# Patient Record
Sex: MALE | Race: WHITE | URBAN - METROPOLITAN AREA
[De-identification: names, ages, dates, MRNs, and addresses within clinical notes are randomized per-mention and may not be internally consistent; named-entity substitution may affect disease eponyms.]

---

## 2019-01-29 ENCOUNTER — OFFICE VISIT (OUTPATIENT)
Dept: URBAN - METROPOLITAN AREA CLINIC 45 | Facility: CLINIC | Age: 69
End: 2019-01-29
Payer: MEDICARE

## 2019-01-29 DIAGNOSIS — E11.9 TYPE 2 DIABETES MELLITUS WITHOUT COMPLICATIONS: ICD-10-CM

## 2019-01-29 DIAGNOSIS — H40.023 OPEN ANGLE WITH BORDERLINE FINDINGS, HIGH RISK, BILATERAL: Primary | ICD-10-CM

## 2019-01-29 DIAGNOSIS — H52.4 PRESBYOPIA: ICD-10-CM

## 2019-01-29 PROCEDURE — 92004 COMPRE OPH EXAM NEW PT 1/>: CPT | Performed by: OPTOMETRIST

## 2019-01-29 PROCEDURE — 92250 FUNDUS PHOTOGRAPHY W/I&R: CPT | Performed by: OPTOMETRIST

## 2019-01-29 ASSESSMENT — INTRAOCULAR PRESSURE
OS: 17
OD: 19

## 2019-01-29 NOTE — IMPRESSION/PLAN
Impression: Type 2 diabetes mellitus without complications: U86.4. Plan: Diabetes type II: no background retinopathy, no signs of neovascularization noted. Discussed ocular and systemic benefits of blood sugar control.

## 2019-02-04 ENCOUNTER — OFFICE VISIT (OUTPATIENT)
Dept: URBAN - METROPOLITAN AREA CLINIC 45 | Facility: CLINIC | Age: 69
End: 2019-02-04
Payer: MEDICARE

## 2019-02-04 ASSESSMENT — KERATOMETRY
OD: 42.25
OS: 42.50

## 2019-02-04 ASSESSMENT — INTRAOCULAR PRESSURE
OD: 19
OS: 16

## 2019-02-04 ASSESSMENT — VISUAL ACUITY
OD: 20/20
OS: 20/20

## 2022-01-03 ENCOUNTER — OFFICE VISIT (OUTPATIENT)
Dept: URBAN - METROPOLITAN AREA CLINIC 44 | Facility: CLINIC | Age: 72
End: 2022-01-03
Payer: MEDICARE

## 2022-01-03 DIAGNOSIS — H25.13 AGE-RELATED NUCLEAR CATARACT, BILATERAL: ICD-10-CM

## 2022-01-03 DIAGNOSIS — H04.123 TEAR FILM INSUFFICIENCY OF BILATERAL LACRIMAL GLANDS: Primary | ICD-10-CM

## 2022-01-03 DIAGNOSIS — H40.1133 PRIMARY OPEN-ANGLE GLAUCOMA, SEVERE STAGE, BILATERAL: ICD-10-CM

## 2022-01-03 PROCEDURE — 92133 CPTRZD OPH DX IMG PST SGM ON: CPT | Performed by: OPTOMETRIST

## 2022-01-03 PROCEDURE — 99204 OFFICE O/P NEW MOD 45 MIN: CPT | Performed by: OPTOMETRIST

## 2022-01-03 RX ORDER — LATANOPROST 50 UG/ML
0.005 % SOLUTION OPHTHALMIC
Qty: 2.5 | Refills: 3 | Status: INACTIVE
Start: 2022-01-03 | End: 2022-03-03

## 2022-01-03 ASSESSMENT — KERATOMETRY
OD: 42.50
OS: 42.75

## 2022-01-03 ASSESSMENT — INTRAOCULAR PRESSURE
OD: 20
OS: 22

## 2022-01-03 ASSESSMENT — VISUAL ACUITY
OS: 20/20
OD: 20/20

## 2022-01-03 NOTE — IMPRESSION/PLAN
Impression: Primary open-angle glaucoma, severe stage, bilateral: H40.1133. Plan: Advanced cupping OD>OS
      -Drance hemorrhage OS on 01/03/21 IOP: 20/22 OCT RNFL (01/03/22): OD 73 (bdl overall, sup/inf thinning) OS 73 (bdl overall, sup/inf thinning) Start latanoprost QHS OU.   RTC 2 months for IOP + pachymetry + 24-2 HVF

## 2022-03-03 ENCOUNTER — OFFICE VISIT (OUTPATIENT)
Dept: URBAN - METROPOLITAN AREA CLINIC 44 | Facility: CLINIC | Age: 72
End: 2022-03-03
Payer: MEDICARE

## 2022-03-03 PROCEDURE — 76514 ECHO EXAM OF EYE THICKNESS: CPT | Performed by: OPTOMETRIST

## 2022-03-03 PROCEDURE — 92083 EXTENDED VISUAL FIELD XM: CPT | Performed by: OPTOMETRIST

## 2022-03-03 PROCEDURE — 99213 OFFICE O/P EST LOW 20 MIN: CPT | Performed by: OPTOMETRIST

## 2022-03-03 RX ORDER — LATANOPROST 50 UG/ML
0.005 % SOLUTION OPHTHALMIC
Qty: 7.5 | Refills: 3 | Status: ACTIVE
Start: 2022-03-03

## 2022-03-03 ASSESSMENT — INTRAOCULAR PRESSURE
OD: 15
OS: 15
OD: 20
OS: 21

## 2022-03-03 NOTE — IMPRESSION/PLAN
Impression: Primary open-angle glaucoma, severe stage, bilateral: H40.1133. Plan: Advanced cupping OD>OS
      -Drance hemorrhage OS on 01/03/21 PACH: 560/560 IOP: 15/15 Current meds: latanoprost QHS OU (started 01/03/22) OCT RNFL (01/03/22): OD 73 (bdl overall, sup/inf thinning) OS 73 (bdl overall, sup/inf thinning) HVF (03/03/22) OD: nasal step (unreliable) OS: mild scatter (unreliable) IOP improved but may still be borderline. Continue current meds. RTC 3 months for IOP check. May need to add meds if IOP increases.

## 2022-06-08 ENCOUNTER — OFFICE VISIT (OUTPATIENT)
Dept: URBAN - METROPOLITAN AREA CLINIC 44 | Facility: CLINIC | Age: 72
End: 2022-06-08
Payer: MEDICARE

## 2022-06-08 DIAGNOSIS — H40.1133 PRIMARY OPEN-ANGLE GLAUCOMA, SEVERE STAGE, BILATERAL: Primary | ICD-10-CM

## 2022-06-08 PROCEDURE — 99213 OFFICE O/P EST LOW 20 MIN: CPT | Performed by: OPTOMETRIST

## 2022-06-08 RX ORDER — BRIMONIDINE TARTRATE 2 MG/ML
0.2 % SOLUTION/ DROPS OPHTHALMIC
Qty: 10 | Refills: 3 | Status: ACTIVE
Start: 2022-06-08

## 2022-06-08 ASSESSMENT — INTRAOCULAR PRESSURE
OD: 18
OS: 17
OD: 14
OS: 18

## 2022-06-08 NOTE — IMPRESSION/PLAN
Impression: Primary open-angle glaucoma, severe stage, bilateral: H40.1133. Plan: Advanced cupping OD>OS
      -Drance hemorrhage OS on 01/03/21 PACH: 560/560 IOP: 18/18 Current meds: latanoprost QHS OU (started 01/03/22) Hx of Asthma -- avoid beta blockers OCT RNFL (01/03/22): OD 73 (bdl overall, sup/inf thinning) OS 73 (bdl overall, sup/inf thinning) HVF (03/03/22) OD: nasal step (unreliable) OS: mild scatter (unreliable) IOP suboptimal.  Continue latanoprost QHS OU. Start brimonidine BID OU. RTC 2 months for IOP check.

## 2022-08-01 ENCOUNTER — OFFICE VISIT (OUTPATIENT)
Dept: URBAN - METROPOLITAN AREA CLINIC 44 | Facility: CLINIC | Age: 72
End: 2022-08-01
Payer: MEDICARE

## 2022-08-01 DIAGNOSIS — H40.1133 PRIMARY OPEN-ANGLE GLAUCOMA, SEVERE STAGE, BILATERAL: Primary | ICD-10-CM

## 2022-08-01 PROCEDURE — 99213 OFFICE O/P EST LOW 20 MIN: CPT | Performed by: OPTOMETRIST

## 2022-08-01 RX ORDER — BRIMONIDINE TARTRATE 2 MG/ML
0.2 % SOLUTION/ DROPS OPHTHALMIC
Qty: 30 | Refills: 3 | Status: ACTIVE
Start: 2022-08-01

## 2022-08-01 ASSESSMENT — INTRAOCULAR PRESSURE
OS: 15
OD: 14
OS: 13
OD: 13

## 2022-08-01 NOTE — IMPRESSION/PLAN
Impression: Primary open-angle glaucoma, severe stage, bilateral: H40.1133. Plan: Advanced cupping OD>OS
      -Drance hemorrhage OS on 01/03/21 PACH: 560/560 IOP: 13/13 Current meds: latanoprost QHS OU (started 01/03/22), brimonidine BID OU (started 06/08/22) Hx of Asthma -- avoid beta blockers OCT RNFL (01/03/22): OD 73 (bdl overall, sup/inf thinning) OS 73 (bdl overall, sup/inf thinning) HVF (03/03/22) OD: nasal step (unreliable) OS: mild scatter (unreliable) IOP improved. Continue current meds.    RTC 4 months for Complete Exam + OCT RNFL

## 2022-11-30 ENCOUNTER — OFFICE VISIT (OUTPATIENT)
Dept: URBAN - METROPOLITAN AREA CLINIC 44 | Facility: CLINIC | Age: 72
End: 2022-11-30
Payer: MEDICARE

## 2022-11-30 DIAGNOSIS — Z79.84 LONG TERM (CURRENT) USE OF ORAL HYPOGLYCEMIC DRUGS: ICD-10-CM

## 2022-11-30 DIAGNOSIS — H40.1133 PRIMARY OPEN-ANGLE GLAUCOMA, BILATERAL, SEVERE STAGE: ICD-10-CM

## 2022-11-30 DIAGNOSIS — H04.123 TEAR FILM INSUFFICIENCY OF BILATERAL LACRIMAL GLANDS: Primary | ICD-10-CM

## 2022-11-30 DIAGNOSIS — E11.9 DIABETES MELLITUS TYPE 2 WITHOUT MENTION OF COMPLICATION: ICD-10-CM

## 2022-11-30 DIAGNOSIS — H25.13 AGE-RELATED NUCLEAR CATARACT, BILATERAL: ICD-10-CM

## 2022-11-30 PROCEDURE — 92134 CPTRZ OPH DX IMG PST SGM RTA: CPT | Performed by: OPTOMETRIST

## 2022-11-30 PROCEDURE — 99214 OFFICE O/P EST MOD 30 MIN: CPT | Performed by: OPTOMETRIST

## 2022-11-30 PROCEDURE — 92133 CPTRZD OPH DX IMG PST SGM ON: CPT | Performed by: OPTOMETRIST

## 2022-11-30 ASSESSMENT — INTRAOCULAR PRESSURE
OS: 13
OD: 13
OS: 14

## 2022-11-30 ASSESSMENT — VISUAL ACUITY
OS: 20/20
OD: 20/30

## 2022-11-30 ASSESSMENT — KERATOMETRY
OD: 42.38
OS: 42.75

## 2022-11-30 NOTE — IMPRESSION/PLAN
Impression: Diabetes mellitus Type 2 without mention of complication: O38.8. Plan: No Non-Proliferative Diabetic Retinopathy, no Diabetic Macular Edema and no Neovascularization of the iris, disc, or elsewhere. Discussed ocular and systemic benefits of blood sugar control. Check annually. denies pain/discomfort

## 2022-11-30 NOTE — IMPRESSION/PLAN
Impression: Age-related nuclear cataract, bilateral: H25.13. Plan: Patient is eligible for cataract surgery OD, but is satisfied with current level of vision and declined to have surgery. RTC if vision changes. Monitor.

## 2022-11-30 NOTE — IMPRESSION/PLAN
Impression: Primary open-angle glaucoma, severe stage, bilateral: H40.1133. Plan: Advanced cupping OD>OS
      -Drance hemorrhage OS on 01/03/21 PACH: 560/560 IOP: 13/13 Current meds: latanoprost QHS OU (started 01/03/22), brimonidine BID OU (started 06/08/22) Hx of Asthma -- avoid beta blockers OCT RNFL (11/30/22): OD 63 (thin overall, sup/inf thinning) OS 69 (bdl overall, sup/inf thinning) HVF (03/03/22) OD: nasal step (unreliable) OS: mild scatter (unreliable) IOP excellent but OCT shows progression OD>OS. However, optic nerve appearance stable. Continue current meds. RTC 4 months for IOP + 24-2 HVF + repeat OCT RNFL. If repeatable OCT changes noted, may need to add meds/treatment.

## 2023-03-29 ENCOUNTER — OFFICE VISIT (OUTPATIENT)
Dept: URBAN - METROPOLITAN AREA CLINIC 44 | Facility: CLINIC | Age: 73
End: 2023-03-29
Payer: MEDICARE

## 2023-03-29 DIAGNOSIS — H40.1133 PRIMARY OPEN-ANGLE GLAUCOMA, BILATERAL, SEVERE STAGE: Primary | ICD-10-CM

## 2023-03-29 PROCEDURE — 92083 EXTENDED VISUAL FIELD XM: CPT | Performed by: OPTOMETRIST

## 2023-03-29 PROCEDURE — 99214 OFFICE O/P EST MOD 30 MIN: CPT | Performed by: OPTOMETRIST

## 2023-03-29 PROCEDURE — 92133 CPTRZD OPH DX IMG PST SGM ON: CPT | Performed by: OPTOMETRIST

## 2023-03-29 ASSESSMENT — INTRAOCULAR PRESSURE
OD: 12
OS: 13

## 2023-03-29 NOTE — IMPRESSION/PLAN
Impression: Primary open-angle glaucoma, severe stage, bilateral: H40.1133. Plan: Advanced cupping OD>OS
      -Drance hemorrhage OS on 01/03/21 PACH: 560/560 IOP: 12/13 Current meds: latanoprost QHS OU (started 01/03/22), brimonidine BID OU (started 06/08/22) Hx of Asthma -- avoid beta blockers OCT RNFL (03/29/23): OD 64 (thin overall, sup/inf thinning) OS 77 (wnl overall, bdl sup thinning, inf thinning) HVF (03/29/23): OD: double shallow arcuate defect (unreliable) OS: nasal step, shallow superior defect (unreliable) IOP excellent but repeat OCT shows progression OD>OS. However, optic nerve appearance stable. Continue current meds. Recommend refer to Glaucoma to determine if current regimen acceptable or patient would benefit from another med and/or SLT.

## 2023-11-09 ENCOUNTER — OFFICE VISIT (OUTPATIENT)
Dept: URBAN - METROPOLITAN AREA CLINIC 44 | Facility: CLINIC | Age: 73
End: 2023-11-09
Payer: MEDICARE

## 2023-11-09 DIAGNOSIS — H25.13 AGE-RELATED NUCLEAR CATARACT, BILATERAL: ICD-10-CM

## 2023-11-09 DIAGNOSIS — E11.9 DIABETES MELLITUS TYPE 2 WITHOUT MENTION OF COMPLICATION: ICD-10-CM

## 2023-11-09 DIAGNOSIS — H40.1133 PRIMARY OPEN-ANGLE GLAUCOMA, SEVERE STAGE, BILATERAL: Primary | ICD-10-CM

## 2023-11-09 DIAGNOSIS — H04.123 TEAR FILM INSUFFICIENCY OF BILATERAL LACRIMAL GLANDS: ICD-10-CM

## 2023-11-09 PROCEDURE — 99214 OFFICE O/P EST MOD 30 MIN: CPT | Performed by: OPTOMETRIST

## 2023-11-09 PROCEDURE — 92133 CPTRZD OPH DX IMG PST SGM ON: CPT | Performed by: OPTOMETRIST

## 2023-11-09 ASSESSMENT — VISUAL ACUITY
OD: 20/25
OS: 20/20

## 2023-11-09 ASSESSMENT — INTRAOCULAR PRESSURE
OS: 13
OD: 12

## 2023-11-09 ASSESSMENT — KERATOMETRY
OD: 42.75
OS: 43.25

## 2024-06-06 ENCOUNTER — OFFICE VISIT (OUTPATIENT)
Dept: URBAN - METROPOLITAN AREA CLINIC 44 | Facility: CLINIC | Age: 74
End: 2024-06-06
Payer: MEDICARE

## 2024-06-06 DIAGNOSIS — H40.1133 PRIMARY OPEN-ANGLE GLAUCOMA, SEVERE STAGE, BILATERAL: Primary | ICD-10-CM

## 2024-06-06 DIAGNOSIS — H25.13 AGE-RELATED NUCLEAR CATARACT, BILATERAL: ICD-10-CM

## 2024-06-06 PROCEDURE — 92020 GONIOSCOPY: CPT | Performed by: OPHTHALMOLOGY

## 2024-06-06 PROCEDURE — 99204 OFFICE O/P NEW MOD 45 MIN: CPT | Performed by: OPHTHALMOLOGY

## 2024-06-06 PROCEDURE — 92083 EXTENDED VISUAL FIELD XM: CPT | Performed by: OPHTHALMOLOGY

## 2024-06-06 ASSESSMENT — INTRAOCULAR PRESSURE
OD: 13
OS: 13

## 2024-06-18 ENCOUNTER — TECH ONLY (OUTPATIENT)
Dept: URBAN - METROPOLITAN AREA CLINIC 44 | Facility: CLINIC | Age: 74
End: 2024-06-18
Payer: MEDICARE

## 2024-06-18 DIAGNOSIS — Z01.818 ENCOUNTER FOR OTHER PREPROCEDURAL EXAMINATION: Primary | ICD-10-CM

## 2024-06-18 DIAGNOSIS — H25.13 AGE-RELATED NUCLEAR CATARACT, BILATERAL: Primary | ICD-10-CM

## 2024-06-18 PROCEDURE — 99203 OFFICE O/P NEW LOW 30 MIN: CPT | Performed by: PHYSICIAN ASSISTANT

## 2024-06-18 ASSESSMENT — PACHYMETRY
OD: 3.33
OS: 26.40
OD: 26.26
OS: 3.39

## 2024-06-24 ENCOUNTER — SURGERY (OUTPATIENT)
Dept: URBAN - METROPOLITAN AREA SURGERY 19 | Facility: SURGERY | Age: 74
End: 2024-06-24
Payer: MEDICARE

## 2024-06-24 PROCEDURE — 65855 TRABECULOPLASTY LASER SURG: CPT | Performed by: OPHTHALMOLOGY

## 2024-06-24 RX ORDER — PREDNISOLONE ACETATE 10 MG/ML
1 % SUSPENSION/ DROPS OPHTHALMIC
Qty: 1 | Refills: 1 | Status: ACTIVE
Start: 2024-06-24

## 2024-07-01 ENCOUNTER — SURGERY (OUTPATIENT)
Dept: URBAN - METROPOLITAN AREA SURGERY 19 | Facility: SURGERY | Age: 74
End: 2024-07-01
Payer: MEDICARE

## 2024-07-01 PROCEDURE — 66991 XCAPSL CTRC RMVL INSJ 1+: CPT | Performed by: OPHTHALMOLOGY

## 2024-07-02 ENCOUNTER — POST-OPERATIVE VISIT (OUTPATIENT)
Dept: URBAN - METROPOLITAN AREA CLINIC 44 | Facility: CLINIC | Age: 74
End: 2024-07-02
Payer: MEDICARE

## 2024-07-02 DIAGNOSIS — Z48.810 ENCOUNTER FOR SURGICAL AFTERCARE FOLLOWING SURGERY ON A SENSE ORGAN: Primary | ICD-10-CM

## 2024-07-02 PROCEDURE — 99024 POSTOP FOLLOW-UP VISIT: CPT | Performed by: OPTOMETRIST

## 2024-07-02 ASSESSMENT — INTRAOCULAR PRESSURE
OD: 18
OD: 15

## 2024-07-10 ENCOUNTER — POST-OPERATIVE VISIT (OUTPATIENT)
Dept: URBAN - METROPOLITAN AREA CLINIC 44 | Facility: CLINIC | Age: 74
End: 2024-07-10
Payer: MEDICARE

## 2024-07-10 DIAGNOSIS — H52.4 PRESBYOPIA: Primary | ICD-10-CM

## 2024-07-10 DIAGNOSIS — Z48.810 ENCOUNTER FOR SURGICAL AFTERCARE FOLLOWING SURGERY ON A SENSE ORGAN: ICD-10-CM

## 2024-07-10 PROCEDURE — 92015 DETERMINE REFRACTIVE STATE: CPT | Performed by: OPTOMETRIST

## 2024-07-10 PROCEDURE — 99024 POSTOP FOLLOW-UP VISIT: CPT | Performed by: OPTOMETRIST

## 2024-07-10 ASSESSMENT — KERATOMETRY
OD: 42.38
OS: 43.00

## 2024-07-10 ASSESSMENT — INTRAOCULAR PRESSURE
OD: 13
OD: 16
OS: 10
OS: 14

## 2024-07-10 ASSESSMENT — VISUAL ACUITY
OS: 20/20
OD: 20/20

## 2024-07-22 ENCOUNTER — SURGERY (OUTPATIENT)
Dept: URBAN - METROPOLITAN AREA SURGERY 19 | Facility: SURGERY | Age: 74
End: 2024-07-22
Payer: MEDICARE

## 2024-07-22 PROCEDURE — 66991 XCAPSL CTRC RMVL INSJ 1+: CPT | Performed by: OPHTHALMOLOGY

## 2024-07-23 ENCOUNTER — POST-OPERATIVE VISIT (OUTPATIENT)
Dept: URBAN - METROPOLITAN AREA CLINIC 44 | Facility: CLINIC | Age: 74
End: 2024-07-23
Payer: MEDICARE

## 2024-07-23 DIAGNOSIS — Z96.1 PRESENCE OF INTRAOCULAR LENS: Primary | ICD-10-CM

## 2024-07-23 PROCEDURE — 99024 POSTOP FOLLOW-UP VISIT: CPT | Performed by: OPTOMETRIST

## 2024-07-23 ASSESSMENT — INTRAOCULAR PRESSURE
OS: 30
OS: 26
OD: 18
OD: 20

## 2024-08-19 ENCOUNTER — POST-OPERATIVE VISIT (OUTPATIENT)
Dept: URBAN - METROPOLITAN AREA CLINIC 44 | Facility: CLINIC | Age: 74
End: 2024-08-19
Payer: MEDICARE

## 2024-08-19 DIAGNOSIS — Z96.1 PRESENCE OF INTRAOCULAR LENS: ICD-10-CM

## 2024-08-19 DIAGNOSIS — H52.4 PRESBYOPIA: Primary | ICD-10-CM

## 2024-08-19 PROCEDURE — 99024 POSTOP FOLLOW-UP VISIT: CPT | Performed by: OPTOMETRIST

## 2024-08-19 RX ORDER — DORZOLAMIDE HCL 20 MG/ML
2 % SOLUTION/ DROPS OPHTHALMIC
Qty: 10 | Refills: 3 | Status: ACTIVE
Start: 2024-08-19

## 2024-08-19 ASSESSMENT — INTRAOCULAR PRESSURE
OD: 18
OS: 18
OS: 13
OD: 13

## 2024-08-19 ASSESSMENT — VISUAL ACUITY
OS: 20/20
OD: 20/20

## 2024-12-05 ENCOUNTER — RX ONLY (OUTPATIENT)
Age: 74
Setting detail: RX ONLY
End: 2024-12-05

## 2024-12-05 RX ORDER — DOXYCYCLINE HYCLATE 100 MG/1
1 CAPSULE, GELATIN COATED ORAL BID
Qty: 30 | Refills: 0 | Status: ERX | COMMUNITY
Start: 2024-12-04

## 2024-12-05 RX ORDER — PREDNISONE 20 MG/1
2 TABLET ORAL QD
Qty: 60 | Refills: 0 | Status: ERX | COMMUNITY
Start: 2024-12-04

## 2024-12-17 ENCOUNTER — APPOINTMENT (OUTPATIENT)
Dept: URBAN - METROPOLITAN AREA CLINIC 168 | Facility: CLINIC | Age: 74
Setting detail: DERMATOLOGY
End: 2024-12-17

## 2024-12-17 DIAGNOSIS — L20.89 OTHER ATOPIC DERMATITIS: ICD-10-CM | Status: INADEQUATELY CONTROLLED

## 2024-12-17 DIAGNOSIS — L12.0 BULLOUS PEMPHIGOID: ICD-10-CM | Status: IMPROVED

## 2024-12-17 PROCEDURE — ? PRESCRIPTION SAMPLES PROVIDED

## 2024-12-17 PROCEDURE — ? COUNSELING

## 2024-12-17 PROCEDURE — ? PRESCRIPTION MEDICATION MANAGEMENT

## 2024-12-17 PROCEDURE — 99214 OFFICE O/P EST MOD 30 MIN: CPT | Mod: 25

## 2024-12-17 PROCEDURE — ? PRESCRIPTION

## 2024-12-17 PROCEDURE — 96372 THER/PROPH/DIAG INJ SC/IM: CPT

## 2024-12-17 PROCEDURE — ? SEPARATE AND IDENTIFIABLE DOCUMENTATION

## 2024-12-17 PROCEDURE — ? DUPIXENT INITIATION

## 2024-12-17 PROCEDURE — ? DUPIXENT INJECTION

## 2024-12-17 PROCEDURE — ? BIOLOGIC INJECTION TRAINING

## 2024-12-17 RX ORDER — DOXYCYCLINE HYCLATE 100 MG/1
1 TABLET CAPSULE, GELATIN COATED ORAL EVERY 12 HOURS
Qty: 60 | Refills: 3 | Status: ERX

## 2024-12-17 RX ORDER — CLOBETASOL PROPIONATE 0.5 MG/G
CREAM TOPICAL
Qty: 60 | Refills: 2 | Status: ERX | COMMUNITY
Start: 2024-12-17

## 2024-12-17 RX ORDER — ALENDRONATE SODIUM 70 MG/1
1 TABLET ORAL QWEEK
Qty: 12 | Refills: 3 | Status: ERX | COMMUNITY
Start: 2024-12-17

## 2024-12-17 RX ORDER — PANTOPRAZOLE SODIUM 40 MG/1
1 TABLET, DELAYED RELEASE ORAL QAM
Qty: 90 | Refills: 6 | Status: ERX | COMMUNITY
Start: 2024-12-17

## 2024-12-17 RX ADMIN — PREDNISONE 4: 10 TABLET ORAL at 00:00

## 2024-12-17 RX ADMIN — PANTOPRAZOLE SODIUM 1: 40 TABLET, DELAYED RELEASE ORAL at 00:00

## 2024-12-17 RX ADMIN — ALENDRONATE SODIUM 1: 70 TABLET ORAL at 00:00

## 2024-12-17 RX ADMIN — CLOBETASOL PROPIONATE: 0.5 CREAM TOPICAL at 00:00

## 2024-12-17 ASSESSMENT — BSA ECZEMA: % BODY COVERED IN ECZEMA: 10

## 2024-12-17 ASSESSMENT — LOCATION DETAILED DESCRIPTION DERM
LOCATION DETAILED: RIGHT ANTERIOR DISTAL THIGH
LOCATION DETAILED: RIGHT ANTERIOR PROXIMAL THIGH
LOCATION DETAILED: LEFT ANTERIOR LATERAL PROXIMAL THIGH
LOCATION DETAILED: LEFT ANTERIOR DISTAL THIGH
LOCATION DETAILED: RIGHT PROXIMAL PRETIBIAL REGION
LOCATION DETAILED: LEFT PROXIMAL PRETIBIAL REGION

## 2024-12-17 ASSESSMENT — SEVERITY ASSESSMENT: SEVERITY: MODERATE

## 2024-12-17 ASSESSMENT — LOCATION SIMPLE DESCRIPTION DERM
LOCATION SIMPLE: LEFT PRETIBIAL REGION
LOCATION SIMPLE: LEFT THIGH
LOCATION SIMPLE: RIGHT THIGH
LOCATION SIMPLE: RIGHT PRETIBIAL REGION

## 2024-12-17 ASSESSMENT — SEVERITY ASSESSMENT 2020: SEVERITY 2020: MODERATE

## 2024-12-17 ASSESSMENT — ITCH NUMERIC RATING SCALE: HOW SEVERE IS YOUR ITCHING?: 7

## 2024-12-17 ASSESSMENT — LOCATION ZONE DERM: LOCATION ZONE: LEG

## 2024-12-17 NOTE — PROCEDURE: DUPIXENT INJECTION
Expiration Date (Optional): 05/31/2026
Render If Medication Purchased By Clinic In Visit Note?: Yes
Syringe Size Used (Required For Enhanced Ndc): 300 mg/2ml prefilled pen
Consent: The risks of pain and injection site reactions were reviewed with the patient prior to the injection.
Additional Comments: Due to severity of rash, patient given loading dose samples in office. I demonstrated proper use of pen injection and patient successfully self injected second pen.
Date Of Next Injection: 2 Weeks
02796 Billing Preferences: 1
Lot # (Optional): [id number]
Ndc (300 Mg Prefilled Pen): 2258-0229-47
Ndc (300 Mg Prefilled Syringe): 3946-0170-97
Hide Non-Enhanced Ndc Variable: No
Administered By (Optional): Debbie and hal
Ndc (200 Mg Prefilled Syringe): 7553-6906-48
Detail Level: None
Ndc (200 Mg Prefilled Pen): 0364-7105-40
J-Code: 
Dupixent Dosing: 600 mg

## 2024-12-17 NOTE — PROCEDURE: DUPIXENT INITIATION
Is Methotrexate Contraindicated?: Yes
Dupixent Dosing: 600 mg SC day 0 then 300 mg SC every other week
Add Pregnancy And Lactation Warning To Medication Counseling?: No
Pregnancy And Lactation Warning Text: There have not been adverse fetal risks in women taking Dupixent while pregnant. It is unknown if this medication is excreted in breast milk.
Diagnosis (Required): Atopic Dermatitis/Eczematous Dermatitis
Detail Level: Zone
Dupixent Monitoring Guidelines: There is no laboratory monitoring requirement with Dupixent.

## 2024-12-17 NOTE — PROCEDURE: PRESCRIPTION SAMPLES PROVIDED
Detail Level: Zone
Lot/Batch Number (Optional): [id number]
Expiration Date (Optional): 05/31/2026
Samples Given: Dupixent

## 2024-12-17 NOTE — HPI: BLISTERS (BULLOUS PEMPHIGOID)
How Severe Is It?: moderate
Is This A New Presentation, Or A Follow-Up?: Follow Up Bullous Pemphigoid
Additional History: Ysabel was seen at [place] for Bullous pemphigoid in October, 2024.  He was controlled and sent home on prednisone.  He is following up.  Since his visit at [place], he has been re prescribed prednisone 20 mg.  A few days ago he got a blister in his mouth and a few in his upper back and legs. He increased hospitalizations dose back to 40 mg a day.

## 2024-12-17 NOTE — PROCEDURE: BIOLOGIC INJECTION TRAINING
Enbrel Training Text: We discussed Enbrel injection. I demonstrated how to clean the skin and administer the medication.
Who Did You Train: The Patient
Cimzia Training Text: We discussed Cimzia injection. I demonstrated how to clean the skin and administer the medication.
Adbry Training Text: We discussed Adbry injection. I demonstrated how to clean the skin and administer the medication.
Ilumya Training Text: We discussed Ilumya injection. I demonstrated how to clean the skin and administer the medication.
Hadlima Training Text: We discussed Hadlima injection. I demonstrated how to clean the skin and administer the medication.
Bimzelx Training Text: We discussed Bimzelx injection. I demonstrated how to clean the skin and administer the medication.
Simponi Training Text: We discussed Simponi injection. I demonstrated how to clean the skin and administer the medication.
Humira Training Text: We discussed Humira injection. I demonstrated how to clean the skin and administer the medication.
Skyrizi Training Text: We discussed Skyrizi injection. I demonstrated how to clean the skin and administer the medication.
Cosentyx Training Text: We discussed Cosentyx injection. I demonstrated how to clean the skin and administer the medication.
Dupixent Training Text: We discussed Dupixent injection. I demonstrated how to clean the skin and administer the medication.
Stelara Training Text: We discussed Stelara injection. I demonstrated how to clean the skin and administer the medication.
Xolair Training Text: We discussed Xolair injection. I demonstrated how to clean the skin and administer the medication.
Siliq Training Text: We discussed Siliq injection. I demonstrated how to clean the skin and administer the medication.
Yusimry Training Text: We discussed Yusimry injection. I demonstrated how to clean the skin and administer the medication.
Taltz Training Text: We discussed Taltz injection. I demonstrated how to clean the skin and administer the medication.
Detail Level: Simple
Tremfya Training Text: We discussed Tremfya injection. I demonstrated how to clean the skin and administer the medication.
Which Biologic Is The Patient Receiving Training For?: Dupixent

## 2024-12-17 NOTE — PROCEDURE: PRESCRIPTION MEDICATION MANAGEMENT
Continue Regimen: Doxycycline 100 mg twice a day. \\nClobetasol cream twice a day for any new blisters if they should pop up.
Modify Regimen: Prednisone 40 mg for 2 more weeks, then reduce to 30 mg a day until follow up.
Initiate Treatment: Alendronate 70 mg once a week for protection of bones\\[id number] mg once a day 30 minutes prior to breakfast.
Render In Strict Bullet Format?: No
Detail Level: Zone
Plan: Patient was given Dupixent in-patient 2 months ago and has not had any since. He states he is currently on prednisone.  Gave him samples of Dupixent in office. Started on doxycycline 100 mg BID. Patient has appointment with PCP in January. Advised to check and record blood sugar daily. If blood sugar is over 150 consistently on 40mg of prednisone then to move up PCP appointment sooner. Discussed starting alendronate with risks and benefits.  Due to length of time since getting starter dose, patient requires loading dose again.
Initiate Treatment: Clobetasol to worst areas twice a day for ip to 2 weeks.
Plan: Due to extensive body surface area, topical application is not only impossible, it is also not controlling his itching therefore he is not sleeping and this in turn is affecting his quality of life.

## 2024-12-18 RX ORDER — PREDNISONE 10 MG/1
4 TABLET ORAL QD
Qty: 120 | Refills: 0 | Status: ERX | COMMUNITY
Start: 2024-12-17

## 2025-01-15 ENCOUNTER — APPOINTMENT (OUTPATIENT)
Dept: URBAN - METROPOLITAN AREA CLINIC 168 | Facility: CLINIC | Age: 75
Setting detail: DERMATOLOGY
End: 2025-01-15

## 2025-01-15 DIAGNOSIS — L12.0 BULLOUS PEMPHIGOID: ICD-10-CM

## 2025-01-15 DIAGNOSIS — L20.89 OTHER ATOPIC DERMATITIS: ICD-10-CM

## 2025-01-15 PROCEDURE — ? COUNSELING

## 2025-01-15 PROCEDURE — 99214 OFFICE O/P EST MOD 30 MIN: CPT

## 2025-01-15 PROCEDURE — ? DUPIXENT INITIATION

## 2025-01-15 PROCEDURE — ? PRESCRIPTION MEDICATION MANAGEMENT

## 2025-01-15 ASSESSMENT — LOCATION DETAILED DESCRIPTION DERM
LOCATION DETAILED: RIGHT PROXIMAL PRETIBIAL REGION
LOCATION DETAILED: LEFT ANTERIOR DISTAL THIGH
LOCATION DETAILED: RIGHT ANTERIOR DISTAL THIGH
LOCATION DETAILED: LEFT PROXIMAL PRETIBIAL REGION

## 2025-01-15 ASSESSMENT — BSA ECZEMA: % BODY COVERED IN ECZEMA: 10

## 2025-01-15 ASSESSMENT — LOCATION SIMPLE DESCRIPTION DERM
LOCATION SIMPLE: LEFT THIGH
LOCATION SIMPLE: RIGHT PRETIBIAL REGION
LOCATION SIMPLE: RIGHT THIGH
LOCATION SIMPLE: LEFT PRETIBIAL REGION

## 2025-01-15 ASSESSMENT — ITCH NUMERIC RATING SCALE: HOW SEVERE IS YOUR ITCHING?: 7

## 2025-01-15 ASSESSMENT — LOCATION ZONE DERM: LOCATION ZONE: LEG

## 2025-01-15 ASSESSMENT — SEVERITY ASSESSMENT 2020: SEVERITY 2020: MILD

## 2025-01-15 NOTE — PROCEDURE: PRESCRIPTION MEDICATION MANAGEMENT
Plan: 100% clear after 3 shots of Dupixent. Failed previous Clobetasol and Eucrissa samples.\\n\\nIGA of 4 prior to start of therapy. BSA 20% prior to start of therapy. Itch 10/10 prior to start of therapy. 
Detail Level: Zone
Continue Regimen: Clobetasol to worst areas twice a day up to 2 weeks, but in recent past, no active lesions.
Render In Strict Bullet Format?: No
Continue Regimen: Doxycycline 100 mg twice a day. \\nClobetasol cream twice a day for any new blisters if they should pop up.\\nAlendronate 70mg once a week for prevention of osteoporosis\\nPantoprazole 40mg once a day 30 minutes prior to breakfast.
Modify Regimen: Decrease prednisone from 30 mg to 20 mg and if no flare up for 1 month then decrease to 10 mg/d.
Plan: He sees his PCP at the end of the month, and has been unable to check his blood sugars due to malfunctioning glucose monitoring strips, so he plans to purchase new supply.

## 2025-01-16 ENCOUNTER — RX ONLY (RX ONLY)
Age: 75
End: 2025-01-16

## 2025-01-16 RX ORDER — DUPILUMAB 300 MG/2ML
INJECTION, SOLUTION SUBCUTANEOUS
Qty: 4 | Refills: 6 | Status: ERX

## 2025-01-16 RX ORDER — DUPILUMAB 300 MG/2ML
INJECTION, SOLUTION SUBCUTANEOUS
Qty: 4 | Refills: 6 | Status: ERX | COMMUNITY
Start: 2025-01-16

## 2025-03-03 ENCOUNTER — APPOINTMENT (OUTPATIENT)
Dept: URBAN - METROPOLITAN AREA CLINIC 168 | Facility: CLINIC | Age: 75
Setting detail: DERMATOLOGY
End: 2025-03-03

## 2025-03-03 DIAGNOSIS — L20.89 OTHER ATOPIC DERMATITIS: ICD-10-CM | Status: STABLE

## 2025-03-03 DIAGNOSIS — L12.0 BULLOUS PEMPHIGOID: ICD-10-CM

## 2025-03-03 PROCEDURE — ? PRESCRIPTION MEDICATION MANAGEMENT

## 2025-03-03 PROCEDURE — 99214 OFFICE O/P EST MOD 30 MIN: CPT

## 2025-03-03 PROCEDURE — ? DUPIXENT MONITORING

## 2025-03-03 PROCEDURE — ? COUNSELING

## 2025-03-03 ASSESSMENT — BSA ECZEMA: % BODY COVERED IN ECZEMA: 0

## 2025-03-03 ASSESSMENT — LOCATION DETAILED DESCRIPTION DERM
LOCATION DETAILED: LEFT ANTERIOR DISTAL THIGH
LOCATION DETAILED: LEFT PROXIMAL PRETIBIAL REGION
LOCATION DETAILED: RIGHT ANTERIOR DISTAL THIGH
LOCATION DETAILED: RIGHT PROXIMAL PRETIBIAL REGION

## 2025-03-03 ASSESSMENT — SEVERITY ASSESSMENT 2020: SEVERITY 2020: CLEAR

## 2025-03-03 ASSESSMENT — LOCATION SIMPLE DESCRIPTION DERM
LOCATION SIMPLE: RIGHT PRETIBIAL REGION
LOCATION SIMPLE: LEFT PRETIBIAL REGION
LOCATION SIMPLE: RIGHT THIGH
LOCATION SIMPLE: LEFT THIGH

## 2025-03-03 ASSESSMENT — ITCH NUMERIC RATING SCALE: HOW SEVERE IS YOUR ITCHING?: 1

## 2025-03-03 ASSESSMENT — LOCATION ZONE DERM: LOCATION ZONE: LEG

## 2025-03-03 NOTE — PROCEDURE: PRESCRIPTION MEDICATION MANAGEMENT
Plan: 100% clear after 3 shots of Dupixent. Failed previous Clobetasol and Eucrissa samples.\\n\\nIGA of 4 prior to start of therapy. BSA 20% prior to start of therapy. Itch 10/10 prior to start of therapy.\\n\\n
Detail Level: Zone
Render In Strict Bullet Format?: No
Continue Regimen: Doxycycline 100 mg twice a day. \\nClobetasol cream twice a day for any new blisters if they should pop up.\\nAlendronate 70mg once a week for prevention of osteoporosis\\nPantoprazole 40mg once a day 30 minutes prior to breakfast.
Modify Regimen: Decrease prednisone from 20 mg to 10 mg. If mild flare, spot treat with clobetasol. If moderate flare, may increase to 15mg/d of prednisone. If more severe, increase to 20mg/d and call office with any questions.

## 2025-04-04 ENCOUNTER — RX ONLY (RX ONLY)
Age: 75
End: 2025-04-04

## 2025-04-04 RX ORDER — DUPILUMAB 300 MG/2ML
INJECTION, SOLUTION SUBCUTANEOUS
Qty: 4 | Refills: 5 | Status: ERX

## 2025-04-14 ENCOUNTER — APPOINTMENT (OUTPATIENT)
Dept: URBAN - METROPOLITAN AREA CLINIC 168 | Facility: CLINIC | Age: 75
Setting detail: DERMATOLOGY
End: 2025-04-14

## 2025-04-14 DIAGNOSIS — L20.89 OTHER ATOPIC DERMATITIS: ICD-10-CM

## 2025-04-14 DIAGNOSIS — L12.0 BULLOUS PEMPHIGOID: ICD-10-CM

## 2025-04-14 PROCEDURE — 99214 OFFICE O/P EST MOD 30 MIN: CPT

## 2025-04-14 PROCEDURE — ? DUPIXENT MONITORING

## 2025-04-14 PROCEDURE — ? ORDER TESTS

## 2025-04-14 PROCEDURE — ? COUNSELING

## 2025-04-14 PROCEDURE — ? PRESCRIPTION SAMPLES PROVIDED

## 2025-04-14 PROCEDURE — ? PRESCRIPTION MEDICATION MANAGEMENT

## 2025-04-14 ASSESSMENT — LOCATION SIMPLE DESCRIPTION DERM
LOCATION SIMPLE: RIGHT THIGH
LOCATION SIMPLE: LEFT THIGH
LOCATION SIMPLE: RIGHT PRETIBIAL REGION
LOCATION SIMPLE: LEFT PRETIBIAL REGION

## 2025-04-14 ASSESSMENT — LOCATION DETAILED DESCRIPTION DERM
LOCATION DETAILED: LEFT ANTERIOR DISTAL THIGH
LOCATION DETAILED: RIGHT PROXIMAL PRETIBIAL REGION
LOCATION DETAILED: RIGHT ANTERIOR DISTAL THIGH
LOCATION DETAILED: LEFT PROXIMAL PRETIBIAL REGION

## 2025-04-14 ASSESSMENT — LOCATION ZONE DERM: LOCATION ZONE: LEG

## 2025-04-14 NOTE — PROCEDURE: ORDER TESTS
Expected Date Of Service: 04/14/2025
Billing Type: Third-Party Bill
Lab Facility: [id number]
Performing Laboratory: [id number]
Bill For Surgical Tray: no

## 2025-04-14 NOTE — PROCEDURE: PRESCRIPTION MEDICATION MANAGEMENT
Plan: Patient [id number]% clear after 3 shots of Dupixent and remains. Lear to date. Failed previous Clobetasol and Eucrissa samples.\\n\\nIGA of 4 prior to start of therapy. BSA 20% prior to start of therapy. Itch 10/10 prior to start of therapy.\\n\\nCurrent IGA 0 \\n\\nSamples given to patient today while waiting for authorization to go through
Detail Level: Zone
Continue Regimen: Dupixent 300 mg every 2 weeks
Render In Strict Bullet Format?: No
Samples Given: 2 boxes of Dupixent 300 mg pens
Continue Regimen: Doxycycline 100 mg twice a day. \\nClobetasol cream twice a day for any new blisters if they should pop up.\\nAlendronate 70mg once a week for prevention of osteoporosis\\nPantoprazole 40mg once a day 30 minutes prior to breakfast.
Modify Regimen: Decrease prednisone 10 mg  to 5mg once a day (he will take 1/2 of a 10mg tablet)  for one full month then discontinue completely, assuming no flare.
Plan: Patient to get labs drawn to see where his BP antibodies are compared to at presentation.  He has improved nicely.

## 2025-04-14 NOTE — PROCEDURE: PRESCRIPTION SAMPLES PROVIDED
Lot/Batch Number (Optional): [id number]
Expiration Date (Optional): 2026-10-31
Samples Given: Dupixent 300 mg/2 mL pens 2 boxes
Detail Level: Zone

## 2025-04-15 ENCOUNTER — RX ONLY (RX ONLY)
Age: 75
End: 2025-04-15

## 2025-04-15 RX ORDER — DUPILUMAB 300 MG/2ML
INJECTION, SOLUTION SUBCUTANEOUS
Qty: 4 | Refills: 5 | Status: ERX

## 2025-05-28 ENCOUNTER — APPOINTMENT (OUTPATIENT)
Dept: URBAN - METROPOLITAN AREA CLINIC 168 | Facility: CLINIC | Age: 75
Setting detail: DERMATOLOGY
End: 2025-05-28

## 2025-05-28 DIAGNOSIS — L12.0 BULLOUS PEMPHIGOID: ICD-10-CM

## 2025-05-28 DIAGNOSIS — L20.89 OTHER ATOPIC DERMATITIS: ICD-10-CM | Status: WELL CONTROLLED

## 2025-05-28 PROCEDURE — ? PRESCRIPTION MEDICATION MANAGEMENT

## 2025-05-28 PROCEDURE — ? DUPIXENT MONITORING

## 2025-05-28 PROCEDURE — ? COUNSELING

## 2025-05-28 PROCEDURE — ? ADDITIONAL NOTES

## 2025-05-28 PROCEDURE — 99214 OFFICE O/P EST MOD 30 MIN: CPT

## 2025-05-28 ASSESSMENT — LOCATION DETAILED DESCRIPTION DERM
LOCATION DETAILED: LEFT ANTERIOR DISTAL THIGH
LOCATION DETAILED: RIGHT ANTERIOR DISTAL THIGH
LOCATION DETAILED: RIGHT PROXIMAL PRETIBIAL REGION
LOCATION DETAILED: LEFT PROXIMAL PRETIBIAL REGION

## 2025-05-28 ASSESSMENT — BSA ECZEMA: % BODY COVERED IN ECZEMA: 0

## 2025-05-28 ASSESSMENT — LOCATION SIMPLE DESCRIPTION DERM
LOCATION SIMPLE: RIGHT THIGH
LOCATION SIMPLE: LEFT PRETIBIAL REGION
LOCATION SIMPLE: RIGHT PRETIBIAL REGION
LOCATION SIMPLE: LEFT THIGH

## 2025-05-28 ASSESSMENT — ITCH NUMERIC RATING SCALE: HOW SEVERE IS YOUR ITCHING?: 0

## 2025-05-28 ASSESSMENT — SEVERITY ASSESSMENT 2020: SEVERITY 2020: CLEAR

## 2025-05-28 ASSESSMENT — LOCATION ZONE DERM: LOCATION ZONE: LEG

## 2025-05-28 NOTE — PROCEDURE: PRESCRIPTION MEDICATION MANAGEMENT
Plan: Patient [id number]% clear after 3 shots of Dupixent and remains. Lear to date. Failed previous Clobetasol and Eucrissa samples.\\n\\nIGA of 4 prior to start of therapy. BSA 20% prior to start of therapy. Itch 10/10 prior to start of therapy.\\n\\nCurrent IGA 0 \\n\\nPatient’s last dose  of prednisone was  three weeks ago. Patient did not get antibody test last visit. Due to patient remaining clear, will defer lab at this time.
Detail Level: Zone
Continue Regimen: Dupixent 300 mg every 2 weeks
Render In Strict Bullet Format?: No
Continue Regimen: Clobetasol cream twice a day for any new blisters if they should pop up.\\nAlendronate 70mg once a week for prevention of osteoporosis\\nPantoprazole 40mg once a day 30 minutes prior to breakfast.
Modify Regimen: Decreased doxycycline temp once daily for 3 to 4 weeks. If no flare ups then can stop completely.
Plan: No flare off of prednisone, well controlled.

## 2025-05-28 NOTE — PROCEDURE: ADDITIONAL NOTES
Detail Level: Simple
Render Risk Assessment In Note?: no
Additional Notes: Patient complains of pain with auto injector. Recommended applying lidocaine patch or cream to expected injection site under Saran wrap for 1 hour prior to injection to minimize pain. Also discussed switching to syringe for prior with next approval if patient wishes. He is not sure he would want a syringe so will consider.

## 2025-07-11 ENCOUNTER — OFFICE VISIT (OUTPATIENT)
Dept: URBAN - METROPOLITAN AREA CLINIC 44 | Facility: CLINIC | Age: 75
End: 2025-07-11
Payer: MEDICARE

## 2025-07-11 DIAGNOSIS — H04.123 DRY EYE SYNDROME OF BILATERAL LACRIMAL GLANDS: ICD-10-CM

## 2025-07-11 DIAGNOSIS — H40.1133 PRIMARY OPEN-ANGLE GLAUCOMA, SEVERE STAGE, BILATERAL: ICD-10-CM

## 2025-07-11 DIAGNOSIS — Z79.84 LONG TERM (CURRENT) USE OF ORAL HYPOGLYCEMIC DRUGS: ICD-10-CM

## 2025-07-11 DIAGNOSIS — Z96.1 PRESENCE OF INTRAOCULAR LENS: ICD-10-CM

## 2025-07-11 DIAGNOSIS — E11.9 DIABETES MELLITUS TYPE 2 WITHOUT MENTION OF COMPLICATION: Primary | ICD-10-CM

## 2025-07-11 PROCEDURE — 92014 COMPRE OPH EXAM EST PT 1/>: CPT

## 2025-07-11 PROCEDURE — 92133 CPTRZD OPH DX IMG PST SGM ON: CPT

## 2025-07-11 ASSESSMENT — VISUAL ACUITY
OS: 20/20
OD: 20/20

## 2025-07-11 ASSESSMENT — INTRAOCULAR PRESSURE
OS: 14
OD: 14

## 2025-07-11 ASSESSMENT — KERATOMETRY
OS: 43.13
OD: 42.50